# Patient Record
Sex: FEMALE | URBAN - METROPOLITAN AREA
[De-identification: names, ages, dates, MRNs, and addresses within clinical notes are randomized per-mention and may not be internally consistent; named-entity substitution may affect disease eponyms.]

---

## 2018-10-01 ENCOUNTER — OFFICE VISIT (OUTPATIENT)
Dept: URBAN - METROPOLITAN AREA CLINIC 58 | Facility: CLINIC | Age: 73
End: 2018-10-01
Payer: MEDICARE

## 2018-10-01 DIAGNOSIS — H52.4 PRESBYOPIA: ICD-10-CM

## 2018-10-01 DIAGNOSIS — H35.372 PUCKERING OF MACULA, LEFT EYE: Primary | ICD-10-CM

## 2018-10-01 PROCEDURE — 92014 COMPRE OPH EXAM EST PT 1/>: CPT | Performed by: OPTOMETRIST

## 2018-10-01 PROCEDURE — 92134 CPTRZ OPH DX IMG PST SGM RTA: CPT | Performed by: OPTOMETRIST

## 2018-10-01 ASSESSMENT — KERATOMETRY
OS: 44.50
OD: 44.75

## 2018-10-01 ASSESSMENT — VISUAL ACUITY
OS: 20/30
OD: 20/20

## 2018-10-01 ASSESSMENT — INTRAOCULAR PRESSURE
OD: 14
OS: 14

## 2018-10-01 NOTE — IMPRESSION/PLAN
Impression: Puckering of macula, left eye: H35.372. Plan: Discussed diagnosis in detail with patient. No treatment is required at this time. Will continue to observe condition and or symptoms. Call if [address] worsens. Continue care with Dr. Aparna Peter.

## 2019-11-27 ENCOUNTER — OFFICE VISIT (OUTPATIENT)
Dept: URBAN - METROPOLITAN AREA CLINIC 58 | Facility: CLINIC | Age: 74
End: 2019-11-27
Payer: MEDICARE

## 2019-11-27 DIAGNOSIS — H26.491 OTHER SECONDARY CATARACT, RIGHT EYE: ICD-10-CM

## 2019-11-27 DIAGNOSIS — H04.123 DRY EYE SYNDROME OF BILATERAL LACRIMAL GLANDS: Primary | ICD-10-CM

## 2019-11-27 PROCEDURE — 99213 OFFICE O/P EST LOW 20 MIN: CPT | Performed by: OPTOMETRIST

## 2019-11-27 ASSESSMENT — INTRAOCULAR PRESSURE
OD: 14
OS: 14